# Patient Record
Sex: FEMALE | Race: OTHER
[De-identification: names, ages, dates, MRNs, and addresses within clinical notes are randomized per-mention and may not be internally consistent; named-entity substitution may affect disease eponyms.]

---

## 2020-12-04 PROBLEM — Z00.00 ENCOUNTER FOR PREVENTIVE HEALTH EXAMINATION: Status: ACTIVE | Noted: 2020-12-04

## 2020-12-10 DIAGNOSIS — Z86.012 PERSONAL HISTORY OF BENIGN CARCINOID TUMOR: ICD-10-CM

## 2020-12-10 DIAGNOSIS — Z82.49 FAMILY HISTORY OF ISCHEMIC HEART DISEASE AND OTHER DISEASES OF THE CIRCULATORY SYSTEM: ICD-10-CM

## 2020-12-10 DIAGNOSIS — Z72.89 OTHER PROBLEMS RELATED TO LIFESTYLE: ICD-10-CM

## 2020-12-10 DIAGNOSIS — Z87.891 PERSONAL HISTORY OF NICOTINE DEPENDENCE: ICD-10-CM

## 2020-12-10 DIAGNOSIS — Z86.79 PERSONAL HISTORY OF OTHER DISEASES OF THE CIRCULATORY SYSTEM: ICD-10-CM

## 2020-12-10 DIAGNOSIS — Z86.69 PERSONAL HISTORY OF OTHER DISEASES OF THE NERVOUS SYSTEM AND SENSE ORGANS: ICD-10-CM

## 2020-12-10 RX ORDER — PRAMIPEXOLE DIHYDROCHLORIDE 0.25 MG/1
0.25 TABLET ORAL
Refills: 0 | Status: ACTIVE | COMMUNITY

## 2020-12-15 ENCOUNTER — APPOINTMENT (OUTPATIENT)
Dept: HEMATOLOGY ONCOLOGY | Facility: CLINIC | Age: 55
End: 2020-12-15
Payer: COMMERCIAL

## 2020-12-15 VITALS
WEIGHT: 141 LBS | HEART RATE: 63 BPM | OXYGEN SATURATION: 98 % | HEIGHT: 60 IN | BODY MASS INDEX: 27.68 KG/M2 | DIASTOLIC BLOOD PRESSURE: 80 MMHG | SYSTOLIC BLOOD PRESSURE: 130 MMHG | RESPIRATION RATE: 18 BRPM | TEMPERATURE: 98.9 F

## 2020-12-15 PROCEDURE — 99204 OFFICE O/P NEW MOD 45 MIN: CPT

## 2020-12-15 PROCEDURE — 99072 ADDL SUPL MATRL&STAF TM PHE: CPT

## 2020-12-15 NOTE — HISTORY OF PRESENT ILLNESS
[de-identified] : This is a 53 y/o woman with new diagnosis of appendiceal neuroendocrine carcinoma \par At work had severe pain abd pain , Found to have possible appendicitis on ct scan so taking to OR . \par Had appendectomy for appendicitis , incidentally had neuroendocrine carcinoma. \par pathology revealing for  low grade low ki 67 neuroendocrine tumor  1.3 mm, NO LVI or perineural invasion , margins negative \par Spoke to surgery , and they told her that no further surgery planned \par No pain leading up to the event , weeks to months feeling fine. \par No weight loss  trying to \par No diarrhea or change habits,  no bleeidng \par Colonoscopy 3 years ago hyperplastic polyp. \par No flushing or wheezing , no other symptoms \par No fevers or sweats \par \par Family hx - great aunt breast cancer  [de-identified] : feeling well no new compliants energy is better \par Bowels moving well , no diarrhea nausea or gi upset \par no wheezing  or flushing

## 2020-12-15 NOTE — REASON FOR VISIT
[Initial Consultation] : an initial consultation [FreeTextEntry2] : patient here for new dx of appediceal carcinoma

## 2020-12-15 NOTE — ASSESSMENT
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 \par s/p appendectomy \par \par 1) appediceal carcinoma \par -reviewed pathology at length , very well differntiated and low risk factors such as negative for LVI  and ki67 margins negative \par -there appears to be no indication for further surgery such as hemicolectomy or kirsten sampling \par -no indication for any adjuvant therapy given low grade and fully resected and extrememly small \par -nccn does not recommend for any additional imaging or follow up \par -will send chromagranin and 24 hour urine \par -ct done on presentation negative for mets , will get report and plan on repeating in 6 months \par -get colonoscopy now dw patient \par \par 2) obesity \par advise wieght loss and exercise \par \par 3) up todate on mammo and gyn \par \par \par follow up in 6 months

## 2020-12-16 LAB
ALBUMIN SERPL ELPH-MCNC: 4.5 G/DL
ALP BLD-CCNC: 105 U/L
ALT SERPL-CCNC: 18 U/L
ANION GAP SERPL CALC-SCNC: 19 MMOL/L
AST SERPL-CCNC: 20 U/L
BILIRUB SERPL-MCNC: 0.3 MG/DL
BUN SERPL-MCNC: 12 MG/DL
CALCIUM SERPL-MCNC: 10.2 MG/DL
CHLORIDE SERPL-SCNC: 100 MMOL/L
CO2 SERPL-SCNC: 21 MMOL/L
CREAT SERPL-MCNC: 0.79 MG/DL
GLUCOSE SERPL-MCNC: 45 MG/DL
POTASSIUM SERPL-SCNC: 5.5 MMOL/L
PROT SERPL-MCNC: 7.4 G/DL
SODIUM SERPL-SCNC: 140 MMOL/L

## 2020-12-27 LAB — CGA SERPL-MCNC: 30.9 NG/ML

## 2021-07-02 ENCOUNTER — APPOINTMENT (OUTPATIENT)
Dept: HEMATOLOGY ONCOLOGY | Facility: CLINIC | Age: 56
End: 2021-07-02
Payer: COMMERCIAL

## 2021-07-02 VITALS
OXYGEN SATURATION: 99 % | BODY MASS INDEX: 35.93 KG/M2 | DIASTOLIC BLOOD PRESSURE: 94 MMHG | TEMPERATURE: 98.3 F | WEIGHT: 183 LBS | RESPIRATION RATE: 18 BRPM | HEIGHT: 60 IN | SYSTOLIC BLOOD PRESSURE: 169 MMHG | HEART RATE: 68 BPM

## 2021-07-02 DIAGNOSIS — E66.9 OBESITY, UNSPECIFIED: ICD-10-CM

## 2021-07-02 PROCEDURE — 99213 OFFICE O/P EST LOW 20 MIN: CPT

## 2021-07-02 PROCEDURE — 99072 ADDL SUPL MATRL&STAF TM PHE: CPT

## 2021-07-02 NOTE — HISTORY OF PRESENT ILLNESS
[de-identified] : This is a 53 y/o woman with new diagnosis of appendiceal neuroendocrine carcinoma \par At work had severe pain abd pain , Found to have possible appendicitis on ct scan so taking to OR . \par Had appendectomy for appendicitis , incidentally had neuroendocrine carcinoma. \par pathology revealing for  low grade low ki 67 neuroendocrine tumor  1.3 mm, NO LVI or perineural invasion , margins negative \par Spoke to surgery , and they told her that no further surgery planned \par No pain leading up to the event , weeks to months feeling fine. \par No weight loss  trying to \par No diarrhea or change habits,  no bleeidng \par Colonoscopy 3 years ago hyperplastic polyp. \par No flushing or wheezing , no other symptoms \par No fevers or sweats \par \par Family hx - great aunt breast cancer  [de-identified] : feeling well \par colonoscopy in 2 weeks dr arianna aggarwal \par no sob or cough \par just had mammo last month \par gyn next week \par no flushing or diarrhea

## 2021-07-06 LAB — CEA SERPL-MCNC: 2.9 NG/ML

## 2021-07-08 LAB
CGA SERPL-MCNC: 35.4 NG/ML
SEROTONIN SERUM: 63 NG/ML

## 2022-07-05 NOTE — ASSESSMENT
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 \par s/p appendectomy \par \par 1) appediceal carcinoma \par -reviewed pathology at length , very well differntiated and low risk factors such as negative for LVI  and ki67 margins negative \par -no indication for any adjuvant therapy given low grade and fully resected and extrememly small \par -chromagranin negative dec 2020 repeat today \par -repeat cmp cbc \par -no s/s of recurrence \par -ct scan negative for recurrence june 2021 \par -colonsocopy will be done next week \par \par 2) obesity \par advise wieght loss and exercise \par \par 3) up todate on mammo and gyn \par \par \par follow up in 6 months

## 2022-07-05 NOTE — HISTORY OF PRESENT ILLNESS
[de-identified] : This is a 53 y/o woman with new diagnosis of appendiceal neuroendocrine carcinoma \par At work had severe pain abd pain , Found to have possible appendicitis on ct scan so taking to OR . \par Had appendectomy for appendicitis , incidentally had neuroendocrine carcinoma. \par pathology revealing for  low grade low ki 67 neuroendocrine tumor  1.3 mm, NO LVI or perineural invasion , margins negative \par Spoke to surgery , and they told her that no further surgery planned \par No pain leading up to the event , weeks to months feeling fine. \par No weight loss  trying to \par No diarrhea or change habits,  no bleeidng \par Colonoscopy 3 years ago hyperplastic polyp. \par No flushing or wheezing , no other symptoms \par No fevers or sweats \par \par Family hx - great aunt breast cancer  [de-identified] : feeling well \par colonoscopy in 2 weeks dr arianna aggarwal \par no sob or cough \par just had mammo last month \par gyn next week \par no flushing or diarrhea

## 2022-07-07 ENCOUNTER — APPOINTMENT (OUTPATIENT)
Dept: HEMATOLOGY ONCOLOGY | Facility: CLINIC | Age: 57
End: 2022-07-07

## 2022-09-02 ENCOUNTER — APPOINTMENT (OUTPATIENT)
Dept: HEMATOLOGY ONCOLOGY | Facility: CLINIC | Age: 57
End: 2022-09-02

## 2022-09-02 VITALS
TEMPERATURE: 98.2 F | BODY MASS INDEX: 38.48 KG/M2 | OXYGEN SATURATION: 99 % | HEIGHT: 60 IN | HEART RATE: 73 BPM | RESPIRATION RATE: 18 BRPM | WEIGHT: 196 LBS | DIASTOLIC BLOOD PRESSURE: 93 MMHG | SYSTOLIC BLOOD PRESSURE: 163 MMHG

## 2022-11-29 ENCOUNTER — APPOINTMENT (OUTPATIENT)
Dept: HEMATOLOGY ONCOLOGY | Facility: CLINIC | Age: 57
End: 2022-11-29

## 2022-11-29 PROCEDURE — 99214 OFFICE O/P EST MOD 30 MIN: CPT | Mod: 95

## 2022-12-12 ENCOUNTER — NON-APPOINTMENT (OUTPATIENT)
Age: 57
End: 2022-12-12

## 2022-12-13 NOTE — ADDENDUM
[FreeTextEntry1] : Addendum: \par \par Reviewed NCCN guidelines and patient pathology of appendiceal neuroendocrine tumor. Pathology from simple appendectomy on 11/2020 revealing well differentiated neuroendocrine tumor grade 1, tumor size 1.3 mm in greatest dimension, Ki67 < 3%, margins negative, LVI not identified, perineural invasion not identified, LN negative, [id number]. Per NCCN guidelines for appendiceal neuroendocrine tumor <2cm, primary treatment is simple appendectomy and no surveillance indicated.

## 2022-12-13 NOTE — HISTORY OF PRESENT ILLNESS
[Home] : at home, [unfilled] , at the time of the visit. [Other Location: e.g. Home (Enter Location, City,State)___] : at [unfilled] [de-identified] : This is a 53 y/o woman with new diagnosis of appendiceal neuroendocrine carcinoma \par At work had severe pain abd pain , Found to have possible appendicitis on ct scan so taking to OR . \par Had appendectomy for appendicitis , incidentally had neuroendocrine carcinoma. \par pathology revealing for  low grade low ki 67 neuroendocrine tumor  1.3 mm, NO LVI or perineural invasion , margins negative \par Spoke to surgery , and they told her that no further surgery planned \par No pain leading up to the event , weeks to months feeling fine. \par No weight loss  trying to \par No diarrhea or change habits,  no bleeidng \par Colonoscopy 3 years ago hyperplastic polyp. \par No flushing or wheezing , no other symptoms \par No fevers or sweats \par \par Family hx - great aunt breast cancer  [de-identified] : Patient presents via telehealth for yearly follow up. \par Denies flushing or diarrhea. Denies abdominal pain\par Colonoscopy 2021 with Dr. Yoan Rogers. \par CT due now. All CT imaging done at [facility]. Last in file from 6/2021 CT A/P FAUSTINA. \par

## 2022-12-13 NOTE — ASSESSMENT
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 \par \par #appediceal carcinoma \par -reviewed pathology at length , very well differentiated and low risk factors such as negative for LVI  and ki67 margins negative \par -no indication for any adjuvant therapy given low grade and fully resected and extremely small \par -chromogranin negative- repeat will be ordered along with random Urine 5HIAA\par -no s/s of recurrence \par -ct scan negative for recurrence 6/2021. Repeat CT ordered \par - colonoscopy - done in 2021 - supposedly negative per patient, will try to obtain records. Needs repeat in 3 years\par -reviewed NCCN guidelines. >1 year post resection - 10 years H and P, biochemical markers, Abdominal +/- pelvic CT or MRI. CT chest only as clinically indicated - currently she is not having any respiratory symptoms\par \par #Health Maintenance\par - Mammo - needs to be done this year\par - GYN - needs to be seen\par \par RTO in  1 year with cbc with diff, cmp, chromogranin,random Urine 5HIAA

## 2023-11-09 DIAGNOSIS — C7A.8 OTHER MALIGNANT NEUROENDOCRINE TUMORS: ICD-10-CM

## 2023-11-14 ENCOUNTER — APPOINTMENT (OUTPATIENT)
Dept: HEMATOLOGY ONCOLOGY | Facility: CLINIC | Age: 58
End: 2023-11-14